# Patient Record
Sex: MALE | Race: BLACK OR AFRICAN AMERICAN | Employment: UNEMPLOYED | URBAN - METROPOLITAN AREA
[De-identification: names, ages, dates, MRNs, and addresses within clinical notes are randomized per-mention and may not be internally consistent; named-entity substitution may affect disease eponyms.]

---
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
## 2021-02-22 ENCOUNTER — HOSPITAL ENCOUNTER (EMERGENCY)
Age: 21
Discharge: HOME | End: 2021-02-22
Attending: EMERGENCY MEDICINE
Payer: OTHER MISCELLANEOUS

## 2021-02-22 VITALS
BODY MASS INDEX: 26.6 KG/M2 | SYSTOLIC BLOOD PRESSURE: 122 MMHG | WEIGHT: 190 LBS | TEMPERATURE: 97.5 F | RESPIRATION RATE: 18 BRPM | HEIGHT: 71 IN | OXYGEN SATURATION: 100 % | HEART RATE: 97 BPM | DIASTOLIC BLOOD PRESSURE: 78 MMHG

## 2021-02-22 DIAGNOSIS — Z23 NEED FOR TETANUS BOOSTER: ICD-10-CM

## 2021-02-22 DIAGNOSIS — S61.002A AVULSION OF SKIN OF LEFT THUMB, INITIAL ENCOUNTER: Primary | ICD-10-CM

## 2021-02-22 PROCEDURE — 90471 IMMUNIZATION ADMIN: CPT

## 2021-02-22 PROCEDURE — 90715 TDAP VACCINE 7 YRS/> IM: CPT | Performed by: PHYSICIAN ASSISTANT

## 2021-02-22 PROCEDURE — 74011250636 HC RX REV CODE- 250/636: Performed by: PHYSICIAN ASSISTANT

## 2021-02-22 PROCEDURE — 99282 EMERGENCY DEPT VISIT SF MDM: CPT

## 2021-02-22 PROCEDURE — 99283 EMERGENCY DEPT VISIT LOW MDM: CPT

## 2021-02-22 RX ADMIN — TETANUS TOXOID, REDUCED DIPHTHERIA TOXOID AND ACELLULAR PERTUSSIS VACCINE, ADSORBED 0.5 ML: 5; 2.5; 8; 8; 2.5 SUSPENSION INTRAMUSCULAR at 20:54

## 2021-02-22 NOTE — LETTER
[facility] [place] 
[facility] EMERGENCY DEPT 
[address] [address] [phone] Amanda Hurley Child Date: 2/22/2021 Sex: male [id number] [facility] [address] [place] [address] YOB: 2000 Age: 24 y.o. Occupational Medicine Return to Work Form          Date of Treatment:  2/22/2021 Diagnosis: ICD-10-CM ICD-9-CM 1. Avulsion of skin of left thumb, initial encounter  S61.002A 883.0 2. Need for tetanus booster  Z23 V03.7 Instructions:  Employee must return copy of this report to Personnel and/or Supervisor. Patient Identification - Company Protocol:   
 []  Checked & Followed [x]  Not Available PART I:  Check Treatment 
 
 []  X-ray   []  Lab  [x]  Discharge Instructions Given  []  Rx Given 
 []  Non-Prescription Meds  []  Sutures     []  EKG [x]  Other (Comment): Wound dressing Part II:  Disposition 
 
 [x]  May Return to Regular Work Without Restrictions but with wound covered on 2/24/21 []  May Return to Restricted Duty as Below Explanation of RESTRICTIONS (Comment):   
 
 []  May NOT Return to Work until cleared by Referral Specialist or Occupational Medicine MD 
 
Part III:  Follow-Up Follow-up Information Follow up With Specialties Details Why Contact Info [address] [place] [address] Occupational Medicine Schedule an appointment as soon as possible for a visit   [address] [place] [address] [address] [id number] 
[phone] [facility] EMERGENCY DEPT Emergency Medicine  As needed, If symptoms worsen [address] [address] 
[phone] Part IV: Was Workers Comp Supervisor Notified  []   Yes  []   No 
 
Amanda Hurley Child was seen in the Emergency Department on 2/22/2021 by the following provider(s): 
Attending Provider: Regina Ruelas DO Physician Assistant: MORGAN Braun; ED Nurse: PLEASE CALL CASE FACILITATOR, OCCUPATIONAL MEDICINE  
 [phone] TO SCHEDULE AN APPOINTMENT Referral Physicians: OCCUPATIONAL MEDICINE   
THE [facility] [address], [address] [address] [facility] Phone:  [phone]; Fax:  [phone]

## 2021-02-23 NOTE — ED PROVIDER NOTES
EMERGENCY DEPARTMENT HISTORY AND PHYSICAL EXAM    Date: 2/22/2021  Patient Name: Sania Cancino    History of Presenting Illness     Chief Complaint   Patient presents with    Laceration         History Provided By: Patient    Sania Cancino is a 24 y.o. male with no PMHX who presents to the emergency department C/O of thumb laceration occurring 5 hours ago while at work at [employer]. Is unsure of last tetanus. Pt denies any other sxs or complaints. PCP: Lizzy Rivero MD    Current Facility-Administered Medications   Medication Dose Route Frequency Provider Last Rate Last Admin    diph,Pertuss(AC),Tet Vac-PF (BOOSTRIX) suspension 0.5 mL  0.5 mL IntraMUSCular PRIOR TO DISCHARGE Kimberley Yusuf PA         Current Outpatient Medications   Medication Sig Dispense Refill    loratadine-pseudoephedrine (CLARITIN-D 24 HOUR)  mg per tablet Take 1 Tab by mouth daily. 12 Tab 0    fluticasone propionate (FLONASE) 50 mcg/actuation nasal spray 2 Sprays by Nasal route daily. 1 Bottle 0       Past History     Past Medical History:  Past Medical History:   Diagnosis Date    ADHD     ADHD     ADHD (attention deficit hyperactivity disorder)     Constipation        Past Surgical History:  Past Surgical History:   Procedure Laterality Date    HX ORTHOPAEDIC      right knee surgery       Family History:  History reviewed. No pertinent family history. Social History:  Social History     Tobacco Use    Smoking status: Passive Smoke Exposure - Never Smoker    Smokeless tobacco: Never Used   Substance Use Topics    Alcohol use: No    Drug use: No       Allergies: Allergies   Allergen Reactions    Latuda [Lurasidone] Angioedema         Review of Systems   Review of Systems   Musculoskeletal: Positive for arthralgias. Skin: Positive for wound. All other systems reviewed and are negative.       Physical Exam     Vitals:    02/22/21 2028   BP: 122/78   Pulse: 97   Resp: 18   Temp: 97.5 °F (36.4 °C) SpO2: 100%   Weight: 86.2 kg (190 lb)   Height: 5' 11\" (1.803 m)     Physical Exam  Vitals signs and nursing note reviewed. Constitutional:       General: He is not in acute distress. Appearance: Normal appearance. He is normal weight. He is not ill-appearing. HENT:      Head: Normocephalic and atraumatic. Eyes:      Extraocular Movements: Extraocular movements intact. Conjunctiva/sclera: Conjunctivae normal.      Pupils: Pupils are equal, round, and reactive to light. Neck:      Musculoskeletal: Normal range of motion and neck supple. Cardiovascular:      Rate and Rhythm: Normal rate. Pulmonary:      Effort: Pulmonary effort is normal.   Musculoskeletal: Normal range of motion. Left wrist: Normal.      Left hand: He exhibits laceration (1/2 cm avulsion type laceration to the left lateral thigh with bleeding controlled no suture requirement). He exhibits normal range of motion, no bony tenderness, normal capillary refill, no deformity and no swelling. Normal sensation noted. Normal strength noted. Hands:    Skin:     General: Skin is warm and dry. Capillary Refill: Capillary refill takes less than 2 seconds. Neurological:      General: No focal deficit present. Mental Status: He is alert and oriented to person, place, and time. Psychiatric:         Mood and Affect: Mood normal.         Behavior: Behavior normal.           Diagnostic Study Results     Labs -   No results found for this or any previous visit (from the past 12 hour(s)). Radiologic Studies -   No orders to display     CT Results  (Last 48 hours)    None        CXR Results  (Last 48 hours)    None          Medications given in the ED-  Medications   diph,Pertuss(AC),Tet Vac-PF (BOOSTRIX) suspension 0.5 mL (has no administration in time range)         Medical Decision Making   I am the first provider for this patient.     I reviewed the vital signs, available nursing notes, past medical history, past surgical history, family history and social history. Vital Signs-Reviewed the patient's vital signs. Pulse Oximetry Analysis - 100% on RA     Records Reviewed: Nursing Notes    Procedures:  Procedures    ED Course:   8:47 PM   Initial assessment performed. The patients presenting problems have been discussed, and they are in agreement with the care plan formulated and outlined with them. I have encouraged them to ask questions as they arise throughout their visit. Discussion: 24 y.o. male with a small avulsion type laceration to the left lateral thumb while at work 5 hours prior to arrival.  Wound cleansed and dressed. Does not require any suture repair. Tetanus updated. Wound care instructions. Occupational health follow-up and return precautions discussed. Diagnosis and Disposition       DISCHARGE NOTE:  Amanda Olmstead's  results have been reviewed with him. He has been counseled regarding his diagnosis, treatment, and plan. He verbally conveys understanding and agreement of the signs, symptoms, diagnosis, treatment and prognosis and additionally agrees to follow up as discussed. He also agrees with the care-plan and conveys that all of his questions have been answered. I have also provided discharge instructions for him that include: educational information regarding their diagnosis and treatment, and list of reasons why they would want to return to the ED prior to their follow-up appointment, should his condition change. He has been provided with education for proper emergency department utilization. CLINICAL IMPRESSION:    1. Avulsion of skin of left thumb, initial encounter    2. Need for tetanus booster        PLAN:  1. D/C Home  2. Current Discharge Medication List        3.    Follow-up Information     Follow up With Specialties Details Why [address] [place] [address] Occupational Medicine Schedule an appointment as soon as possible for a visit   [address] [place] [address] [address]  [place] [address]  [phone]    [facility] EMERGENCY DE[facility] Emergency Medicine  As needed, If symptoms worsen [address] [place] [address] [phone]                  Please note that this dictation was completed with [employer], the computer voice recognition software. Quite often unanticipated grammatical, syntax, homophones, and other interpretive errors are inadvertently transcribed by the computer software. Please disregard these errors. Please excuse any errors that have escaped final proofreading.

## 2021-02-23 NOTE — ED TRIAGE NOTES
Patient with laceration to left thumb after cutting self with tape gun while at work around 2pm. Unknown tetanus shot. Patient reports taking pain medicine earlier but is not sure what its called.

## 2021-09-26 ENCOUNTER — HOSPITAL ENCOUNTER (EMERGENCY)
Age: 21
Discharge: HOME | End: 2021-09-26
Attending: EMERGENCY MEDICINE
Payer: COMMERCIAL

## 2021-09-26 VITALS
BODY MASS INDEX: 23.67 KG/M2 | WEIGHT: 165.34 LBS | OXYGEN SATURATION: 98 % | HEART RATE: 76 BPM | DIASTOLIC BLOOD PRESSURE: 62 MMHG | SYSTOLIC BLOOD PRESSURE: 112 MMHG | RESPIRATION RATE: 16 BRPM | HEIGHT: 70 IN | TEMPERATURE: 97.8 F

## 2021-09-26 DIAGNOSIS — Z20.822 PERSON UNDER INVESTIGATION FOR COVID-19: ICD-10-CM

## 2021-09-26 DIAGNOSIS — J06.9 VIRAL UPPER RESPIRATORY TRACT INFECTION: Primary | ICD-10-CM

## 2021-09-26 LAB — SARS-COV-2, COV2: NORMAL

## 2021-09-26 PROCEDURE — 99282 EMERGENCY DEPT VISIT SF MDM: CPT

## 2021-09-26 PROCEDURE — U0003 INFECTIOUS AGENT DETECTION BY NUCLEIC ACID (DNA OR RNA); SEVERE ACUTE RESPIRATORY SYNDROME CORONAVIRUS 2 (SARS-COV-2) (CORONAVIRUS DISEASE [COVID-19]), AMPLIFIED PROBE TECHNIQUE, MAKING USE OF HIGH THROUGHPUT TECHNOLOGIES AS DESCRIBED BY CMS-2020-01-R: HCPCS

## 2021-09-26 NOTE — LETTER
[facility] [place]  [facility]  [address] [address]  [address] [phone]    Work/School Note    Date: 9/26/2021    To Whom It May concern:    Riri Loya was seen and treated today in the emergency room by the following provider(s):  Attending Provider: Edin Romano MD  Physician Assistant: MORGAN Siegel. Riri Loya is excused from work/school on 09/26/21 and 09/27/21, until results of COVID-19 test, expected in 48-72hrs.       Sincerely,          MORGAN Barriga

## 2021-09-26 NOTE — ED TRIAGE NOTES
Pt states exposed to Covid 3 weeks ago, pt c/o sob, chest pain, chills, productive cough with yellowish phlegm

## 2021-09-26 NOTE — ED NOTES
Pt discharged per ambulatory, no acute distress on discharge, written inst given to pt , verbalizes understanding  Patient armband removed and shredded

## 2021-09-26 NOTE — ED PROVIDER NOTES
EMERGENCY DEPARTMENT HISTORY AND PHYSICAL EXAM    Date: 9/26/2021  Patient Name: Kailee Nelson    History of Presenting Illness     Chief Complaint   Patient presents with    Concern For [id number] (Coronavirus)         History Provided By: Patient    11:40 AM  Kailee Nelson is a 24 y.o. male who presents to the emergency department C/O cough, congestion, resolved sore throat, intermittent chest pain and body aches which began 2 weeks ago. Patient states 3 weeks ago his friend with whom he was in close contact tested positive for COVID-19. Patient is not vaccinated against COVID-19. Patient smokes marijuana every day. Pt denies ear pain, difficulty swallowing, difficulty breathing, vomiting, diarrhea, and any other sxs or complaints. PCP: Eros Kelley MD    Current Outpatient Medications   Medication Sig Dispense Refill    loratadine-pseudoephedrine (CLARITIN-D 24 HOUR)  mg per tablet Take 1 Tab by mouth daily. 12 Tab 0    fluticasone propionate (FLONASE) 50 mcg/actuation nasal spray 2 Sprays by Nasal route daily. 1 Bottle 0       Past History     Past Medical History:  Past Medical History:   Diagnosis Date    ADHD     ADHD     ADHD (attention deficit hyperactivity disorder)     Constipation        Past Surgical History:  Past Surgical History:   Procedure Laterality Date    HX ORTHOPAEDIC      right knee surgery       Family History:  History reviewed. No pertinent family history. Social History:  Social History     Tobacco Use    Smoking status: Passive Smoke Exposure - Never Smoker    Smokeless tobacco: Never Used   Substance Use Topics    Alcohol use: No    Drug use: Yes     Types: Marijuana       Allergies: Allergies   Allergen Reactions    Latuda [Lurasidone] Angioedema         Review of Systems   Review of Systems   Constitutional: Negative for fever. HENT: Positive for congestion and sore throat. Respiratory: Positive for cough. Negative for shortness of breath. Cardiovascular: Positive for chest pain. Gastrointestinal: Negative for diarrhea and vomiting. All other systems reviewed and are negative. Physical Exam     Vitals:    09/26/21 1133 09/26/21 1136   BP: 112/62    Pulse: 76    Resp: 16    Temp: 97.8 °F (36.6 °C)    SpO2: 98%    Weight:  75 kg (165 lb 5.5 oz)   Height:  5' 10\" (1.778 m)     Physical Exam  Vital signs and nursing notes reviewed. CONSTITUTIONAL: Alert. Well-appearing; well-nourished; in no apparent distress. HEAD: Normocephalic; atraumatic. EYES:Conjunctiva clear. ENT: TM's normal. External ear normal. Normal nose; no rhinorrhea. Normal pharynx. Tonsils not enlarged without exudate. Moist mucus membranes. NECK: Supple; FROM without difficulty, non-tender; no cervical lymphadenopathy. CV: Normal S1, S2; no murmurs, rubs, or gallops. No chest wall tenderness. RESPIRATORY: Normal chest excursion with respiration; breath sounds clear and equal bilaterally; no wheezes, rhonchi, or rales. SKIN: Normal for age and race; warm; dry; good turgor; no apparent lesions or exudate. NEURO: A & O x3. PSYCH:  Mood and affect appropriate. Diagnostic Study Results     Labs -     Recent Results (from the past 12 hour(s))   SARS-COV-2    Collection Time: 09/26/21 11:41 AM   Result Value Ref Range    SARS-CoV-2 Please find results under separate order         Radiologic Studies -   No orders to display     CT Results  (Last 48 hours)    None        CXR Results  (Last 48 hours)    None          Medications given in the ED-  Medications - No data to display      Medical Decision Making   I am the first provider for this patient. I reviewed the vital signs, available nursing notes, past medical history, past surgical history, family history and social history. Vital Signs-Reviewed the patient's vital signs. Records Reviewed: Nursing Notes      Procedures:  Procedures    ED Course:  11:40 AM   Initial assessment performed. The patients presenting problems have been discussed, and they are in agreement with the care plan formulated and outlined with them. I have encouraged them to ask questions as they arise throughout their visit. Provider Notes (Medical Decision Making): Luis Alberto Andres is a 24 y.o. male presents with waxing and waning URI symptoms x2 weeks and positive COVID-19 exposure 3 weeks ago. Vitals are stable, exam is normal, lungs are clear and no fever. Likely viral URI. COVID-19 test sent and pending this time. Recommend smoking cessation, over-the-counter medications for symptomatic relief. Current CDC isolation guidelines until results of test discussed as well as return precautions such as shortness of breath. Diagnosis and Disposition       DISCHARGE NOTE:    Amanda Olmstead's  results have been reviewed with him. He has been counseled regarding his diagnosis, treatment, and plan. He verbally conveys understanding and agreement of the signs, symptoms, diagnosis, treatment and prognosis and additionally agrees to follow up as discussed. He also agrees with the care-plan and conveys that all of his questions have been answered. I have also provided discharge instructions for him that include: educational information regarding their diagnosis and treatment, and list of reasons why they would want to return to the ED prior to their follow-up appointment, should his condition change. He has been provided with education for proper emergency department utilization. CLINICAL IMPRESSION:    1. Viral upper respiratory tract infection    2. Person under investigation for COVID-19        PLAN:  1. D/C Home  2. Discharge Medication List as of 9/26/2021 12:18 PM        3.    Follow-up Information     Follow up With Specialties Details Why Contact Info    Felix Yun MD Pediatric Medicine  As needed [address] [facility] [address] [facility]      [facility] EMERG[facility]Y DEPT Emergency Medicine  As needed, If symptoms worsen [address] [place] [address]  [phone]        _______________________________      Please note that this dictation was completed with [employer], the computer voice recognition software. Quite often unanticipated grammatical, syntax, homophones, and other interpretive errors are inadvertently transcribed by the computer software. Please disregard these errors. Please excuse any errors that have escaped final proofreading.

## 2021-09-27 ENCOUNTER — PATIENT OUTREACH (OUTPATIENT)
Dept: CASE MANAGEMENT | Age: 21
End: 2021-09-27

## 2021-09-28 LAB — SARS-COV-2, COV2NT: DETECTED

## 2021-09-28 NOTE — CALL BACK NOTE
2:57 PM  09/28/21      Spoke with patient's mother who states patient's phone is lost will get in contact with him and have him return call to the ER about results      Alison Mcdermott PA-C

## 2021-09-28 NOTE — CALL BACK NOTE
1:59 PM  09/28/2021    + SARS-COV-2. Called patient. No answer. Left message to call ED back to discuss results.      Randy Flynn PA-C

## 2021-09-29 NOTE — CALL BACK NOTE
8:01 PM  09/28/21      Spoke with patient and informed of positive Covid test advised to quarantine for 10 days following onset of symptoms and follow-up with their doctor.       Robyn Sigala PA-C

## 2021-10-01 NOTE — PROGRESS NOTES
9/27    Contacted patient for Transitions of Care Coordination  follow up. No answer. Left message introducing self, role and reason for call. Requested return call. Contact information provided. Will attempt to contact at a later time. 9/28    Contacted patient for Transitions of Care Coordination  follow up. No answer. Left message introducing self, role and reason for call. Requested return call. Contact information provided. Will attempt to contact at a later time. Have attempted to contact pt x 2 with no return call. Episode resolved at this time.

## 2022-07-17 ENCOUNTER — HOSPITAL ENCOUNTER (EMERGENCY)
Age: 22
Discharge: HOME | End: 2022-07-17
Attending: EMERGENCY MEDICINE
Payer: COMMERCIAL

## 2022-07-17 ENCOUNTER — APPOINTMENT (OUTPATIENT)
Dept: GENERAL RADIOLOGY | Age: 22
End: 2022-07-17
Attending: PHYSICIAN ASSISTANT
Payer: COMMERCIAL

## 2022-07-17 VITALS
TEMPERATURE: 98.8 F | HEART RATE: 76 BPM | RESPIRATION RATE: 18 BRPM | BODY MASS INDEX: 23.68 KG/M2 | WEIGHT: 165 LBS | OXYGEN SATURATION: 100 % | SYSTOLIC BLOOD PRESSURE: 122 MMHG | DIASTOLIC BLOOD PRESSURE: 47 MMHG

## 2022-07-17 DIAGNOSIS — S20.211A RIB CONTUSION, RIGHT, INITIAL ENCOUNTER: Primary | ICD-10-CM

## 2022-07-17 PROCEDURE — 71101 X-RAY EXAM UNILAT RIBS/CHEST: CPT

## 2022-07-17 PROCEDURE — 99283 EMERGENCY DEPT VISIT LOW MDM: CPT

## 2022-07-17 RX ORDER — TRAMADOL HYDROCHLORIDE 50 MG/1
50 TABLET ORAL
Qty: 12 TABLET | Refills: 0 | Status: SHIPPED | OUTPATIENT
Start: 2022-07-17 | End: 2022-07-20

## 2022-07-17 RX ORDER — IBUPROFEN 600 MG/1
600 TABLET ORAL
Qty: 20 TABLET | Refills: 0 | Status: SHIPPED | OUTPATIENT
Start: 2022-07-17

## 2022-07-19 NOTE — ED PROVIDER NOTES
EMERGENCY DEPARTMENT HISTORY AND PHYSICAL EXAM    Date: 7/17/2022  Patient Name: Lynne Julio    History of Presenting Illness     Chief Complaint   Patient presents with    Rib Pain         History Provided By: Patient    Chief Complaint: rib pain    HPI(Context):   12:02 AM  Lynne Julio is a 22 y.o. male with PMHX of ADHD who presents to the emergency department C/O right sided rib pain. Pt fell out of bed onto floor striking ribs against object. Pt notes feeling crunch sensation. Pain with palpation. Pt denies SOB, head trauma, and any other sxs or complaints. PCP: Lily Malave MD    Current Outpatient Medications   Medication Sig Dispense Refill    ibuprofen (MOTRIN) 600 mg tablet Take 1 Tablet by mouth every six (6) hours as needed for Pain. Take with food. 20 Tablet 0    traMADoL (Ultram) 50 mg tablet Take 1 Tablet by mouth every four (4) hours as needed for Pain for up to 3 days. Max Daily Amount: 300 mg. 12 Tablet 0       Past History     Past Medical History:  Past Medical History:   Diagnosis Date    ADHD     ADHD     ADHD (attention deficit hyperactivity disorder)     Constipation        Past Surgical History:  Past Surgical History:   Procedure Laterality Date    HX ORTHOPAEDIC      right knee surgery       Family History:  History reviewed. No pertinent family history. Social History:  Social History     Tobacco Use    Smoking status: Passive Smoke Exposure - Never Smoker    Smokeless tobacco: Never Used   Substance Use Topics    Alcohol use: No    Drug use: Yes     Types: Marijuana       Allergies: Allergies   Allergen Reactions    Latuda [Lurasidone] Angioedema         Review of Systems   Review of Systems   Respiratory: Negative for shortness of breath. Musculoskeletal: Positive for myalgias (right anterior ribs). Negative for neck pain. Neurological: Negative for syncope and headaches. All other systems reviewed and are negative.       Physical Exam     Vitals: 07/17/22 1547 07/17/22 1549   BP: (!) 122/47    Pulse: 76    Resp: 18    Temp:  98.8 °F (37.1 °C)   SpO2: 100%    Weight: 74.8 kg (165 lb)      Physical Exam  Vitals and nursing note reviewed. Constitutional:       General: He is not in acute distress. Appearance: He is well-developed. He is not diaphoretic. Comments: AA male in NAD. Alert. Appears comfortable. Friend at bedside in fast trac   HENT:      Head: Normocephalic and atraumatic. Right Ear: External ear normal.      Left Ear: External ear normal.      Nose: Nose normal.   Eyes:      General: No scleral icterus. Right eye: No discharge. Left eye: No discharge. Conjunctiva/sclera: Conjunctivae normal.   Cardiovascular:      Rate and Rhythm: Normal rate and regular rhythm. Heart sounds: Normal heart sounds. No murmur heard. No friction rub. No gallop. Pulmonary:      Effort: Pulmonary effort is normal. No tachypnea, accessory muscle usage or respiratory distress. Breath sounds: Normal breath sounds. No decreased breath sounds, wheezing, rhonchi or rales. Chest:      Chest wall: Tenderness (right anterior ribs pain) present. No deformity or swelling. Musculoskeletal:         General: Normal range of motion. Cervical back: Normal range of motion. Skin:     General: Skin is warm and dry. Neurological:      Mental Status: He is alert and oriented to person, place, and time. Psychiatric:         Judgment: Judgment normal.             Diagnostic Study Results     Labs -   No results found for this or any previous visit (from the past 12 hour(s)). Radiologic Studies      XR RIBS RT W PA CXR MIN 3 V   Final Result      Negative PA chest and right ribs. CT Results  (Last 48 hours)    None        CXR Results  (Last 48 hours)    None          Medications given in the ED-  Medications - No data to display      Medical Decision Making   I am the first provider for this patient.     I reviewed the vital signs, available nursing notes, past medical history, past surgical history, family history and social history. Vital Signs-Reviewed the patient's vital signs. Pulse Oximetry Analysis - 100% on RA. NORMAL     Records Reviewed: Nursing Notes    Provider Notes (Medical Decision Making): contusion, fx    Procedures:  Procedures    ED Course:   12:02 AM Initial assessment performed. The patients presenting problems have been discussed, and they are in agreement with the care plan formulated and outlined with them. I have encouraged them to ask questions as they arise throughout their visit. Diagnosis and Disposition       Imaging unremarkable. Tx sxs. ICE. Rest. Reasons to RTED discussed with pt. All questions answered. Pt feels comfortable going home at this time. Pt expressed understanding and he agrees with plan. 1. Rib contusion, right, initial encounter        PLAN:  1. D/C Home  2. Discharge Medication List as of 7/17/2022  5:01 PM      START taking these medications    Details   ibuprofen (MOTRIN) 600 mg tablet Take 1 Tablet by mouth every six (6) hours as needed for Pain. Take with food., Normal, Disp-20 Tablet, R-0      traMADoL (Ultram) 50 mg tablet Take 1 Tablet by mouth every four (4) hours as needed for Pain for up to 3 days. Max Daily Amount: 300 mg., Normal, Disp-12 Tablet, R-0         STOP taking these medications       loratadine-pseudoephedrine (CLARITIN-D 24 HOUR)  mg per tablet Comments:   Reason for Stopping:         fluticasone propionate (FLONASE) 50 mcg/actuation nasal spray Comments:   Reason for Stopping:             3.   Follow-up Information     Follow up With Specialties Details Why Contact Info    Kirby Clark MD Pediatric Medicine   [address] [place] [address]      [facility] EMERGENCY DEPT Emergency Medicine   [address]  [phone]        _______________________________    Attestations:   This note is prepared by Kota Horne PA-C.  _______________________________        Please note that this dictation was completed with [employer], the computer voice recognition software. Quite often unanticipated grammatical, syntax, homophones, and other interpretive errors are inadvertently transcribed by the computer software. Please disregard these errors. Please excuse any errors that have escaped final proofreading.

## 2022-12-16 ENCOUNTER — HOSPITAL ENCOUNTER (EMERGENCY)
Age: 22
Discharge: HOME | End: 2022-12-16
Attending: EMERGENCY MEDICINE
Payer: COMMERCIAL

## 2022-12-16 VITALS
RESPIRATION RATE: 14 BRPM | TEMPERATURE: 97.7 F | BODY MASS INDEX: 31.5 KG/M2 | SYSTOLIC BLOOD PRESSURE: 110 MMHG | OXYGEN SATURATION: 100 % | HEART RATE: 60 BPM | WEIGHT: 220 LBS | HEIGHT: 70 IN | DIASTOLIC BLOOD PRESSURE: 52 MMHG

## 2022-12-16 DIAGNOSIS — B34.9 VIRAL SYNDROME: Primary | ICD-10-CM

## 2022-12-16 DIAGNOSIS — R51.9 NONINTRACTABLE HEADACHE, UNSPECIFIED CHRONICITY PATTERN, UNSPECIFIED HEADACHE TYPE: ICD-10-CM

## 2022-12-16 PROCEDURE — 99282 EMERGENCY DEPT VISIT SF MDM: CPT

## 2022-12-16 NOTE — Clinical Note
[facility] [place]  [facility]  [address] [facility] [address]  [address] [phone]    Work/School Note    Date: 12/16/2022    To Whom It May concern:      Conner Coles was seen and treated today in the emergency room by the following provider(s):  Attending Provider: Ghulam Nolasco MD.      Conner Coles is excused from work/school on 12/16/22. He is clear to return to work/school on 12/17/22.         Sincerely,          Miriam Sanchez MD

## 2022-12-16 NOTE — ED PROVIDER NOTES
EMERGENCY DEPARTMENT HISTORY AND PHYSICAL EXAM      Date: 12/16/2022  Patient Name: Ramon Anderson    History of Presenting Illness     Chief Complaint   Patient presents with    Headache       History Provided By: Patient    HPI: Ramon Anderson, 25 y.o. male with PMHx as noted below presents the emergency department for evaluation of headache, congestion, cough, runny nose. Patient reported onset of symptoms Sunday, lasted for 2 to 3 days and have since resolved. Patient states he is entirely asymptomatic at this time but was told he had to have a work note so came to the ED to get a work note for [employer]. Otherwise he had no significant dyspnea, chest pain. The headache had no visual changes, speech difficulty, neck pain  PCP: Cirilo, MD Radha    Current Outpatient Medications   Medication Sig Dispense Refill    ibuprofen (MOTRIN) 600 mg tablet Take 1 Tablet by mouth every six (6) hours as needed for Pain. Take with food. (Patient not taking: Reported on 12/16/2022) 20 Tablet 0       Past History     Past Medical History:  Past Medical History:   Diagnosis Date    ADHD     ADHD     ADHD (attention deficit hyperactivity disorder)     Constipation        Past Surgical History:  Past Surgical History:   Procedure Laterality Date    HX ORTHOPAEDIC      right knee surgery       Family History:  History reviewed. No pertinent family history. Social History:  Social History     Tobacco Use    Smoking status: Never     Passive exposure: Yes    Smokeless tobacco: Never   Substance Use Topics    Alcohol use: No    Drug use: Yes     Types: Marijuana     Comment: smokes daily       Allergies: Allergies   Allergen Reactions    Latuda [Lurasidone] Angioedema         Review of Systems   Review of Systems  Constitutional: Negative for fever, chills, and fatigue.    Neuro: Positive headache, negative lightheadedness    Physical Exam   Physical Exam    GENERAL: alert and oriented, no acute distress  EYES: PEERL, No injection, discharge or icterus. ENT: Mucous membranes pink and moist.  NECK: Supple  LUNGS: Airway patent. Non-labored respirations. HEART: Regular rate and rhythm. ABDOMEN: Non-distended  SKIN:  warm, dry  MSK/EXTREMITIES: Without deformity  NEUROLOGICAL: Alert, oriented      Diagnostic Study Results     Labs -   No results found for this or any previous visit (from the past 12 hour(s)). Radiologic Studies -   No orders to display     CT Results  (Last 48 hours)      None          CXR Results  (Last 48 hours)      None              Medical Decision Making   IAminta MD am the first provider for this patient and am the attending of record for this patient encounter. I reviewed the vital signs, available nursing notes, past medical history, past surgical history, family history and social history. Vital Signs-Reviewed the patient's vital signs. Patient Vitals for the past 12 hrs:   Temp Pulse Resp BP SpO2   12/16/22 1630 97.7 °F (36.5 °C) 60 14 (!) 110/52 100 %         Records Reviewed: Nursing Notes and Old Medical Records    Provider Notes (Medical Decision Making): On presentation, the patient is well appearing, in no acute distress with normal vital signs. Patient with URI symptoms earlier this week, now resolved, entirely asymptomatic at this time. No reason for any work-up in an asymptomatic patient, here just requesting a work note. ED Course:   Initial assessment performed. The patients presenting problems have been discussed, and they are in agreement with the care plan formulated and outlined with them. I have encouraged them to ask questions as they arise throughout their visit. PROGRESS  Amanda Olmstead's  results have been reviewed with him. He has been counseled regarding his diagnosis. He verbally conveys understanding and agreement of the signs, symptoms, diagnosis, treatment and prognosis and additionally agrees to follow up as recommended.   He also agrees with the care-plan and conveys that all of his questions have been answered. I have also put together some discharge instructions for him that include: 1) educational information regarding their diagnosis, 2) how to care for their diagnosis at home, as well a 3) list of reasons why they would want to return to the ED prior to their follow-up appointment, should their condition change. Disposition:  home    PLAN:  1. Discharge Medication List as of 12/16/2022  4:58 PM        2. Follow-up Information       Follow up With Specialties Details Why Contact Info    Avery Kaufman MD Pediatric Medicine Schedule an appointment as soon as possible for a visit   [address] [place] [address] [address] [place] [address]      [facility] EMERGENCY DEPT Emergency Medicine  If symptoms worsen [address] [place] [address]  [phone]          Return to ED if worse     Diagnosis     Clinical Impression:   1. Viral syndrome    2. Nonintractable headache, unspecified chronicity pattern, unspecified headache type        Please note that this dictation was completed with Dragon, computer voice recognition software. Quite often unanticipated grammatical, syntax, homophones, and other interpretive errors are inadvertently transcribed by the computer software. Please disregard these errors. Additionally, please excuse any errors that have escaped final proofreading.

## 2024-10-03 ENCOUNTER — HOSPITAL ENCOUNTER (EMERGENCY)
Facility: HOSPITAL | Age: 24
Discharge: HOME | End: 2024-10-03
Payer: COMMERCIAL

## 2024-10-03 VITALS
OXYGEN SATURATION: 98 % | TEMPERATURE: 98.2 F | DIASTOLIC BLOOD PRESSURE: 66 MMHG | RESPIRATION RATE: 16 BRPM | SYSTOLIC BLOOD PRESSURE: 134 MMHG | HEIGHT: 70 IN | HEART RATE: 63 BPM | BODY MASS INDEX: 24.34 KG/M2 | WEIGHT: 170 LBS
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
## 2024-10-03 DIAGNOSIS — S39.012A LUMBAR STRAIN, INITIAL ENCOUNTER: Primary | ICD-10-CM

## 2024-10-03 PROCEDURE — 99283 EMERGENCY DEPT VISIT LOW MDM: CPT

## 2024-10-03 RX ORDER — IBUPROFEN 600 MG/1
600 TABLET, FILM COATED ORAL EVERY 6 HOURS PRN
Qty: 30 TABLET | Refills: 0 | Status: SHIPPED | OUTPATIENT
Start: 2024-10-03

## 2024-10-03 NOTE — DISCHARGE INSTRUCTIONS
Tylenol, 325mg, 2 every 6 hours as needed for pain  Motrin, as prescribed  Expect to feel pain/tightness neck to tailbone over the next few days  Activity as tolerated  Ice to areas of pain, 20 min, hourly while awake  Return to ED if new/worsening symptoms, or new concerns
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
## 2024-11-06 ENCOUNTER — APPOINTMENT (OUTPATIENT)
Facility: HOSPITAL | Age: 24
End: 2024-11-06
Payer: COMMERCIAL

## 2024-11-06 ENCOUNTER — HOSPITAL ENCOUNTER (EMERGENCY)
Facility: HOSPITAL | Age: 24
Discharge: HOME | End: 2024-11-06
Attending: EMERGENCY MEDICINE
Payer: COMMERCIAL

## 2024-11-06 VITALS
RESPIRATION RATE: 15 BRPM | BODY MASS INDEX: 25.77 KG/M2 | WEIGHT: 180 LBS | TEMPERATURE: 97.9 F | OXYGEN SATURATION: 99 % | HEART RATE: 80 BPM | DIASTOLIC BLOOD PRESSURE: 75 MMHG | HEIGHT: 70 IN | SYSTOLIC BLOOD PRESSURE: 117 MMHG

## 2024-11-06 DIAGNOSIS — Z87.828 HISTORY OF GUNSHOT WOUND: Primary | ICD-10-CM

## 2024-11-06 DIAGNOSIS — M79.5 RETAINED BULLET: ICD-10-CM

## 2024-11-06 PROCEDURE — 73630 X-RAY EXAM OF FOOT: CPT

## 2024-11-06 PROCEDURE — 99283 EMERGENCY DEPT VISIT LOW MDM: CPT

## 2024-11-06 ASSESSMENT — PAIN SCALES - GENERAL: PAINLEVEL_OUTOF10: 10

## 2024-11-06 ASSESSMENT — PAIN - FUNCTIONAL ASSESSMENT: PAIN_FUNCTIONAL_ASSESSMENT: 0-10

## 2024-11-06 NOTE — ED PROVIDER NOTES
transcribed by the computer software.  Please disregard these errors.  Please excuse any errors that have escaped final proofreading.    Bienvenido Alvarado MD  (Electronically signed)            Bienvenido Alvarado MD  11/06/24 0258
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
## 2024-12-31 ENCOUNTER — HOSPITAL ENCOUNTER (EMERGENCY)
Facility: HOSPITAL | Age: 24
Discharge: HOME | End: 2024-12-31
Attending: EMERGENCY MEDICINE
Payer: COMMERCIAL

## 2024-12-31 ENCOUNTER — APPOINTMENT (OUTPATIENT)
Facility: HOSPITAL | Age: 24
End: 2024-12-31
Payer: COMMERCIAL

## 2024-12-31 VITALS
HEART RATE: 100 BPM | SYSTOLIC BLOOD PRESSURE: 128 MMHG | DIASTOLIC BLOOD PRESSURE: 76 MMHG | WEIGHT: 168 LBS | BODY MASS INDEX: 24.11 KG/M2 | RESPIRATION RATE: 15 BRPM | TEMPERATURE: 97.7 F | OXYGEN SATURATION: 98 %

## 2024-12-31 DIAGNOSIS — M79.644 PAIN OF FINGER OF RIGHT HAND: Primary | ICD-10-CM

## 2024-12-31 PROCEDURE — 73140 X-RAY EXAM OF FINGER(S): CPT

## 2024-12-31 PROCEDURE — 99283 EMERGENCY DEPT VISIT LOW MDM: CPT

## 2024-12-31 ASSESSMENT — PAIN - FUNCTIONAL ASSESSMENT: PAIN_FUNCTIONAL_ASSESSMENT: NONE - DENIES PAIN
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
## 2025-09-06 ENCOUNTER — HOSPITAL ENCOUNTER (EMERGENCY)
Facility: HOSPITAL | Age: 25
Discharge: HOME | End: 2025-09-06

## 2025-09-06 VITALS
TEMPERATURE: 98.2 F | HEART RATE: 84 BPM | SYSTOLIC BLOOD PRESSURE: 107 MMHG | WEIGHT: 182 LBS | BODY MASS INDEX: 25.48 KG/M2 | DIASTOLIC BLOOD PRESSURE: 87 MMHG | HEIGHT: 71 IN | OXYGEN SATURATION: 97 % | RESPIRATION RATE: 18 BRPM

## 2025-09-06 DIAGNOSIS — S61.216A LACERATION OF RIGHT LITTLE FINGER WITHOUT FOREIGN BODY WITHOUT DAMAGE TO NAIL, INITIAL ENCOUNTER: Primary | ICD-10-CM

## 2025-09-06 ASSESSMENT — PAIN - FUNCTIONAL ASSESSMENT: PAIN_FUNCTIONAL_ASSESSMENT: 0-10
